# Patient Record
Sex: MALE | Race: BLACK OR AFRICAN AMERICAN | NOT HISPANIC OR LATINO | Employment: FULL TIME | RURAL
[De-identification: names, ages, dates, MRNs, and addresses within clinical notes are randomized per-mention and may not be internally consistent; named-entity substitution may affect disease eponyms.]

---

## 2020-08-31 ENCOUNTER — HISTORICAL (OUTPATIENT)
Dept: ADMINISTRATIVE | Facility: HOSPITAL | Age: 25
End: 2020-08-31

## 2020-08-31 LAB — SARS-COV+SARS-COV-2 AG RESP QL IA.RAPID: NEGATIVE

## 2023-08-18 ENCOUNTER — OFFICE VISIT (OUTPATIENT)
Dept: FAMILY MEDICINE | Facility: CLINIC | Age: 28
End: 2023-08-18
Payer: COMMERCIAL

## 2023-08-18 VITALS
HEART RATE: 109 BPM | TEMPERATURE: 100 F | DIASTOLIC BLOOD PRESSURE: 80 MMHG | OXYGEN SATURATION: 98 % | WEIGHT: 141.63 LBS | HEIGHT: 74 IN | BODY MASS INDEX: 18.18 KG/M2 | RESPIRATION RATE: 15 BRPM | SYSTOLIC BLOOD PRESSURE: 130 MMHG

## 2023-08-18 DIAGNOSIS — Z20.822 CONTACT WITH AND (SUSPECTED) EXPOSURE TO COVID-19: ICD-10-CM

## 2023-08-18 DIAGNOSIS — U07.1 COVID-19 VIRUS INFECTION: Primary | ICD-10-CM

## 2023-08-18 LAB
CTP QC/QA: YES
FLUAV AG NPH QL: NEGATIVE
FLUBV AG NPH QL: NEGATIVE
SARS-COV-2 AG RESP QL IA.RAPID: POSITIVE

## 2023-08-18 PROCEDURE — 99202 PR OFFICE/OUTPT VISIT, NEW, LEVL II, 15-29 MIN: ICD-10-PCS | Mod: ,,, | Performed by: NURSE PRACTITIONER

## 2023-08-18 PROCEDURE — 99202 OFFICE O/P NEW SF 15 MIN: CPT | Mod: ,,, | Performed by: NURSE PRACTITIONER

## 2023-08-18 PROCEDURE — 3075F SYST BP GE 130 - 139MM HG: CPT | Mod: CPTII,,, | Performed by: NURSE PRACTITIONER

## 2023-08-18 PROCEDURE — 1160F PR REVIEW ALL MEDS BY PRESCRIBER/CLIN PHARMACIST DOCUMENTED: ICD-10-PCS | Mod: CPTII,,, | Performed by: NURSE PRACTITIONER

## 2023-08-18 PROCEDURE — 1160F RVW MEDS BY RX/DR IN RCRD: CPT | Mod: CPTII,,, | Performed by: NURSE PRACTITIONER

## 2023-08-18 PROCEDURE — 1159F MED LIST DOCD IN RCRD: CPT | Mod: CPTII,,, | Performed by: NURSE PRACTITIONER

## 2023-08-18 PROCEDURE — 3079F PR MOST RECENT DIASTOLIC BLOOD PRESSURE 80-89 MM HG: ICD-10-PCS | Mod: CPTII,,, | Performed by: NURSE PRACTITIONER

## 2023-08-18 PROCEDURE — 3008F PR BODY MASS INDEX (BMI) DOCUMENTED: ICD-10-PCS | Mod: CPTII,,, | Performed by: NURSE PRACTITIONER

## 2023-08-18 PROCEDURE — 3079F DIAST BP 80-89 MM HG: CPT | Mod: CPTII,,, | Performed by: NURSE PRACTITIONER

## 2023-08-18 PROCEDURE — 87428 SARSCOV & INF VIR A&B AG IA: CPT | Mod: QW,,, | Performed by: NURSE PRACTITIONER

## 2023-08-18 PROCEDURE — 3075F PR MOST RECENT SYSTOLIC BLOOD PRESS GE 130-139MM HG: ICD-10-PCS | Mod: CPTII,,, | Performed by: NURSE PRACTITIONER

## 2023-08-18 PROCEDURE — 3008F BODY MASS INDEX DOCD: CPT | Mod: CPTII,,, | Performed by: NURSE PRACTITIONER

## 2023-08-18 PROCEDURE — 1159F PR MEDICATION LIST DOCUMENTED IN MEDICAL RECORD: ICD-10-PCS | Mod: CPTII,,, | Performed by: NURSE PRACTITIONER

## 2023-08-18 PROCEDURE — 87428 POCT SARS-COV2 (COVID) WITH FLU ANTIGEN: ICD-10-PCS | Mod: QW,,, | Performed by: NURSE PRACTITIONER

## 2023-08-18 NOTE — LETTER
August 18, 2023      [facility] [address] [facility] [address] [address]  Phone: [phone]  Fax: [phone]       Patient: Yehuda Dudley   YOB: 1995  Date of Visit: 08/18/2023    To Whom It May Concern:    Garrick Dudley  was at [facility] on 08/18/2023. The patient may return to work/school on 08/22/2023 with no restrictions. If you have any questions or concerns, or if I can be of further assistance, please do not hesitate to contact me.    Sincerely,    MEDINA Lobo

## 2023-08-18 NOTE — PROGRESS NOTES
MEDINA Roche        PATIENT NAME: Yehuda Dudley  : 1995  DATE: 23  MRN: [id number]      Billing Provider: MEDINA Roche  Level of Service:   Patient PCP Information       Provider PCP Type    Primary Doctor No General            Reason for Visit / Chief Complaint: Fever (100.4 right now; otc ibuprofen used), Shortness of Breath, Generalized Body Aches, Headache, and Documentation Only (Work note & son is currently sick with same symptoms)         History of Present Illness / Problem Focused Workflow     Yehuda Dudley presents to the clinic with Fever (100.4 right now; otc ibuprofen used), Shortness of Breath, Generalized Body Aches, Headache, and Documentation Only (Work note & son is currently sick with same symptoms)     Mr. Dudley presents to clinic with complaints of headache, fever, chills onset two days ago.    Fever   Associated symptoms include coughing and headaches. Pertinent negatives include no abdominal pain, sore throat or wheezing.   Shortness of Breath  Associated symptoms include a fever and headaches. Pertinent negatives include no abdominal pain, sore throat or wheezing.   Headache   Associated symptoms include coughing and a fever. Pertinent negatives include no abdominal pain or sore throat.       Review of Systems     Review of Systems   Constitutional:  Positive for chills, fatigue and fever.   HENT:  Negative for sore throat.    Respiratory:  Positive for cough. Negative for chest tightness, shortness of breath and wheezing.    Cardiovascular: Negative.    Gastrointestinal:  Negative for abdominal pain.   Musculoskeletal:  Positive for myalgias.   Neurological:  Positive for headaches.        Medical / Social / Family History   No past medical history on file.    No past surgical history on file.    Social History  Mr. Yehuda Dudley   reports that he has never smoked. He has never been exposed to tobacco smoke. He has never used smokeless tobacco. He reports  "that he does not drink alcohol and does not use drugs.    Family History  Mr.'s Yehuda Dudley  family history is not on file.    Medications and Allergies     Medications  No outpatient medications have been marked as taking for the 8/18/23 encounter (Office Visit) with Pam Mendez FNP.       Allergies  Review of patient's allergies indicates:  No Known Allergies      Vitals:    08/18/23 0826   BP: 130/80   Pulse: 109   Resp: 15   Temp: (!) 100.4 °F (38 °C)     Physical Exam  Vitals and nursing note reviewed.   Constitutional:       General: He is not in acute distress.     Appearance: Normal appearance. He is ill-appearing.      Comments: Patient asked to come inside from the car for exam   HENT:      Head: Normocephalic.      Right Ear: Tympanic membrane normal.      Left Ear: Tympanic membrane normal.      Nose: Congestion present.      Mouth/Throat:      Pharynx: No oropharyngeal exudate or posterior oropharyngeal erythema.   Eyes:      Pupils: Pupils are equal, round, and reactive to light.   Cardiovascular:      Rate and Rhythm: Normal rate and regular rhythm.      Pulses: Normal pulses.      Heart sounds: Normal heart sounds.   Pulmonary:      Effort: Pulmonary effort is normal.      Breath sounds: Normal breath sounds.   Abdominal:      Palpations: Abdomen is soft.   Musculoskeletal:         General: Normal range of motion.      Cervical back: Normal range of motion.   Skin:     General: Skin is warm and dry.   Neurological:      Mental Status: He is alert and oriented to person, place, and time.   Psychiatric:         Behavior: Behavior normal.            No results found for: "WBC", "HGB", "HCT", "MCV", "PLT"       No results found for: "NA", "K", "CL", "CO2", "GLU", "BUN", "CREATININE", "CALCIUM", "PROT", "ALBUMIN", "BILITOT", "ALKPHOS", "AST", "ALT", "ANIONGAP", "EGFRNORACEVR"     No results found for: "CHOL"  No results found for: "HDL"  No results found for: "LDLCALC"  No results found for: " ""TRIG"  No results found for: "CHOLHDL"     No results found for: "LABA1C", "HGBA1C"     No results found for: "TSH", "Z6OIXHJ", "J1TVFOV", "THYROIDAB", "FREET4"     No results found for: "PSA", "PSATOTAL", "PSAFREE", "PSAFREEPCT"       Health Maintenance Due   Topic Date Due    Hepatitis C Screening  Never done    Lipid Panel  Never done    COVID-19 Vaccine (1) Never done    HIV Screening  Never done    TETANUS VACCINE  Never done       Problem List Items Addressed This Visit    None  Visit Diagnoses       COVID-19 virus infection    -  Primary    Contact with and (suspected) exposure to covid-19        Relevant Orders    POCT SARS-COV2 (COVID) with Flu Antigen (Completed)          Covid positive - continue to treat symptoms, alternated tylenol and ibuprofen for fever, push fluids, rest, may return to work after 5 days or 24 hours without fever and antipyretics.      Health Maintenance Topics with due status: Not Due       Topic Last Completion Date    Influenza Vaccine Not Due       No future appointments.     There are no Patient Instructions on file for this visit.  No follow-ups on file.       Date of encounter: 8/18/23    "

## 2024-03-19 ENCOUNTER — LAB VISIT (OUTPATIENT)
Dept: PRIMARY CARE CLINIC | Facility: CLINIC | Age: 29
End: 2024-03-19

## 2024-03-19 DIAGNOSIS — Z02.83 ENCOUNTER FOR DRUG SCREENING: Primary | ICD-10-CM

## 2024-03-19 PROCEDURE — 99000 SPECIMEN HANDLING OFFICE-LAB: CPT | Mod: ,,, | Performed by: NURSE PRACTITIONER

## 2024-03-19 NOTE — PROGRESS NOTES
Subjective     Patient ID: Yehuda Dudley is a 28 y.o. male.    Chief Complaint: No chief complaint on file.    HPI  Review of Systems       Objective     Physical Exam       Assessment and Plan     1. Encounter for drug screening        Drug testing only           No follow-ups on file.